# Patient Record
Sex: MALE | Race: WHITE | NOT HISPANIC OR LATINO | Employment: OTHER | URBAN - METROPOLITAN AREA
[De-identification: names, ages, dates, MRNs, and addresses within clinical notes are randomized per-mention and may not be internally consistent; named-entity substitution may affect disease eponyms.]

---

## 2021-05-27 ENCOUNTER — RECORDS - HEALTHEAST (OUTPATIENT)
Dept: ADMINISTRATIVE | Facility: CLINIC | Age: 61
End: 2021-05-27

## 2021-12-02 NOTE — TELEPHONE ENCOUNTER
RECORDS RECEIVED FROM: NEED MRI & IMAGING FROM [facility]. L5-S1 radiculopathy / Dr. Miguelina Menjivar @ [facility] /  / MRI, no surgery   DATE RECEIVED: Dec 16, 2021     NOTES STATUS DETAILS   OFFICE NOTE from referring provider RECEIVED    MEDICATION LIST Internal    MRI Received 11/24/21 12/03/21   10:10 AM   CALLED [facility] FOR IMAGES  Mery Hubbard CMA    12/03/21   8:17 AM   RECEIVED FAX FROM  ALL IMAGES AT [facility] NOT AT   Mery Hubbard CMA      12/02/21   2:07 PM   FAXED REQUEST TO  FOR IMAGES  Mery Hubbard CMA

## 2021-12-13 ASSESSMENT — ENCOUNTER SYMPTOMS
NUMBNESS: 1
MUSCLE WEAKNESS: 0
MYALGIAS: 1
ARTHRALGIAS: 0
DISTURBANCES IN COORDINATION: 0
SPEECH CHANGE: 0
NECK PAIN: 0
WEAKNESS: 0
BACK PAIN: 1
DIZZINESS: 1
HEADACHES: 0
JOINT SWELLING: 0
MEMORY LOSS: 0
LOSS OF CONSCIOUSNESS: 0
PARALYSIS: 0
STIFFNESS: 0
TINGLING: 1
SEIZURES: 0
MUSCLE CRAMPS: 1
TREMORS: 0

## 2021-12-16 ENCOUNTER — OFFICE VISIT (OUTPATIENT)
Dept: ORTHOPEDICS | Facility: CLINIC | Age: 61
End: 2021-12-16
Payer: COMMERCIAL

## 2021-12-16 ENCOUNTER — ANCILLARY PROCEDURE (OUTPATIENT)
Dept: GENERAL RADIOLOGY | Facility: CLINIC | Age: 61
End: 2021-12-16
Attending: ORTHOPAEDIC SURGERY
Payer: COMMERCIAL

## 2021-12-16 ENCOUNTER — PRE VISIT (OUTPATIENT)
Dept: ORTHOPEDICS | Facility: CLINIC | Age: 61
End: 2021-12-16

## 2021-12-16 VITALS — HEIGHT: 75 IN | BODY MASS INDEX: 31.08 KG/M2 | WEIGHT: 250 LBS

## 2021-12-16 DIAGNOSIS — M54.16 LUMBAR RADICULOPATHY: ICD-10-CM

## 2021-12-16 DIAGNOSIS — M54.16 LUMBAR RADICULOPATHY: Primary | ICD-10-CM

## 2021-12-16 PROCEDURE — 72100 X-RAY EXAM L-S SPINE 2/3 VWS: CPT | Performed by: RADIOLOGY

## 2021-12-16 PROCEDURE — 72170 X-RAY EXAM OF PELVIS: CPT | Performed by: RADIOLOGY

## 2021-12-16 PROCEDURE — 99204 OFFICE O/P NEW MOD 45 MIN: CPT | Performed by: ORTHOPAEDIC SURGERY

## 2021-12-16 RX ORDER — ASPIRIN 81 MG/1
81 TABLET, CHEWABLE ORAL DAILY
COMMUNITY

## 2021-12-16 ASSESSMENT — MIFFLIN-ST. JEOR: SCORE: 2019.62

## 2021-12-16 NOTE — LETTER
12/16/2021         RE: Fran Singh  [id number] [address]        Dear Colleague,    Thank you for referring your patient, Fran Singh, to the [facility] [place]. Please see a copy of my visit note below.    REASON FOR CONSULTATION: Consult (L5-S1 radiculopathy / Dr. 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 Occasionally tingling in left foot. Occasionally right leg pain in same distribution. Has had back pain intermittently through the years which was managed with PT and hasn't been an issue. Pain is worst at night. No significant improvement since pain started, but pain fluctuates up and down.    Conservative measures:  Injections: none  Medications: medrol dose pack, flexeril, tramadol- none of these helped. Can't tolerate gabapentin due dizziness/nausea.  Therapy: home exercise stretching program daily x 6 years for back, saw PT at [facility] but hasn't helped.     Oswestry (CANDIDO) Questionnaire    OSWESTRY DISABILITY INDEX 12/13/2021   Count 10   Sum 14   Oswestry Score (%) 28   Some recent data might be hidden     PROMIS-10 Scores  Global Mental Health Score: (P) 17  Global Physical Health Score: (P) 13   PROMIS TOTAL - SUBSCORES: (P) 30      Visual Analog Scale (VAS) Questionnaire    VISUAL ANALOG PAIN SCALE 12/16/2021   Back Pain Scale 0-10 8   Right leg pain 0   Left leg pain 8   Neck Pain Scale 0-10 0   Right arm pain 0   Left arm pain 0            REVIEW OF SYSTEMS:     Answers for HPI/ROS submitted by the patient on 12/13/2021  General Symptoms: No  Skin Symptoms: No  HENT Symptoms: No  EYE SYMPTOMS: No  HEART SYMPTOMS: No  LUNG SYMPTOMS: No  INTESTINAL SYMPTOMS: No  URINARY SYMPTOMS:  "No  REPRODUCTIVE SYMPTOMS: No  SKELETAL SYMPTOMS: Yes  BLOOD SYMPTOMS: No  NERVOUS SYSTEM SYMPTOMS: Yes  MENTAL HEALTH SYMPTOMS: No  Back pain: Yes  Muscle aches: Yes  Neck pain: No  Swollen joints: No  Joint pain: No  Bone pain: No  Muscle cramps: Yes  Muscle weakness: No  Joint stiffness: No  Bone fracture: No  Trouble with coordination: No  Dizziness or trouble with balance: Yes  Fainting or black-out spells: No  Memory loss: No  Headache: No  Seizures: No  Speech problems: No  Tingling: Yes  Tremor: No  Weakness: No  Difficulty walking: Yes  Paralysis: No  Numbness: Yes    No Known Allergies    PMH  Obesity BMI 31    No past surgical history on file.    No family history on file.    Social History     Tobacco Use     Smoking status: Not on file     Smokeless tobacco: Not on file   Substance Use Topics     Alcohol use: Not on file   was president of  union    Current Outpatient Medications   Medication     aspirin (ASA) 81 MG chewable tablet     UNABLE TO FIND     No current facility-administered medications for this visit.       PHYSICAL EXAM:    Ht 1.905 m (6' 3\")   Wt 113.4 kg (250 lb)   BMI 31.25 kg/m      Constitutional - Patient is healthy, well-nourished and appears stated age    Respiratory - Patient is breathing normally and in no respiratory distress.    Skin - No suspicious rashes or lesions.    Gait- raises from chair without assistance. Non-antalgic gait. Able to tandem gait.     Neurologic - Sensation intact to light touch bilaterally. Patella +2, ankle + 0.      Spine: Lumbar  - Appearance - Normal  - Palpation - Non-tender to palpation  - Facets non-tender to palpation, facet loading negative  - Straight leg raise positive left    Musculoskeletal:  5/5 iliopsoas, 5/5 quadriceps, 5/5 hamstrings, 5/5 anterior tibialis, 5/5 extensor hallucis longus, 5/5 gastrocnemius.      IMAGING: all imaging is personally reviewed and interpreted during the visit.   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      CLINICAL ASSESSMENT:   61 year old male with left lumbar radiculopathy secondary to L5-S1 disc herniation     DISCUSSION/PLAN:   We discussed the typical treatment of a lumbar disc herniation, and that most people will have resolution of pain without surgery.  We recommend a left L5-S1 transforaminal NEEMA.  This could be repeated 2 or 3 times if it gives him good relief.  He can call or send a [email or web address] message if he would like this repeated.  Our hope would be that his pain will improve over the next 6 weeks.  If not, he was instructed to contact us and we can follow-up to discuss a lumbar discectomy surgery which we briefly discussed.     All questions and concerns were answered to the patient's apparent satisfaction before leaving the clinic. We used the patient's imaging, diagrams, models to explain the pathophysiology of their disease as well as surgical and non-surgical treatment options.     Thank you for allowing us to be a part of this patient's care.   The above plan was formulated by Dr. Brown who also saw and examined the patient.     Respectfully,  Sherry Swain PA-C     I saw and evaluated the patient and developed the plan.  Jacobo Brown MD

## 2021-12-16 NOTE — PROGRESS NOTES
REASON FOR CONSULTATION: Consult (L5-S1 radiculopathy / Dr. 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 Occasionally tingling in left foot. Occasionally right leg pain in same distribution. Has had back pain intermittently through the years which was managed with PT and hasn't been an issue. Pain is worst at night. No significant improvement since pain started, but pain fluctuates up and down.    Conservative measures:  Injections: none  Medications: medrol dose pack, flexeril, tramadol- none of these helped. Can't tolerate gabapentin due dizziness/nausea.  Therapy: home exercise stretching program daily x 6 years for back, saw PT at [facility] but hasn't helped.     Oswestry (CANDIDO) Questionnaire    OSWESTRY DISABILITY INDEX 12/13/2021   Count 10   Sum 14   Oswestry Score (%) 28   Some recent data might be hidden     PROMIS-10 Scores  Global Mental Health Score: (P) 17  Global Physical Health Score: (P) 13   PROMIS TOTAL - SUBSCORES: (P) 30      Visual Analog Scale (VAS) Questionnaire    VISUAL ANALOG PAIN SCALE 12/16/2021   Back Pain Scale 0-10 8   Right leg pain 0   Left leg pain 8   Neck Pain Scale 0-10 0   Right arm pain 0   Left arm pain 0            REVIEW OF SYSTEMS:     Answers for HPI/ROS submitted by the patient on 12/13/2021  General Symptoms: No  Skin Symptoms: No  HENT Symptoms: No  EYE SYMPTOMS: No  HEART SYMPTOMS: No  LUNG SYMPTOMS: No  INTESTINAL SYMPTOMS: No  URINARY SYMPTOMS: No  REPRODUCTIVE SYMPTOMS: No  SKELETAL SYMPTOMS: Yes  BLOOD SYMPTOMS: No  NERVOUS SYSTEM SYMPTOMS: Yes  MENTAL HEALTH SYMPTOMS: No  Back pain: Yes  Muscle aches: Yes  Neck pain: No  Swollen joints: No  Joint pain: No  Bone pain: No  Muscle cramps: Yes  Muscle weakness:  "No  Joint stiffness: No  Bone fracture: No  Trouble with coordination: No  Dizziness or trouble with balance: Yes  Fainting or black-out spells: No  Memory loss: No  Headache: No  Seizures: No  Speech problems: No  Tingling: Yes  Tremor: No  Weakness: No  Difficulty walking: Yes  Paralysis: No  Numbness: Yes    No Known Allergies    PMH  Obesity BMI 31    No past surgical history on file.    No family history on file.    Social History     Tobacco Use     Smoking status: Not on file     Smokeless tobacco: Not on file   Substance Use Topics     Alcohol use: Not on file   was president of  union    Current Outpatient Medications   Medication     aspirin (ASA) 81 MG chewable tablet     UNABLE TO FIND     No current facility-administered medications for this visit.       PHYSICAL EXAM:    Ht 1.905 m (6' 3\")   Wt 113.4 kg (250 lb)   BMI 31.25 kg/m      Constitutional - Patient is healthy, well-nourished and appears stated age    Respiratory - Patient is breathing normally and in no respiratory distress.    Skin - No suspicious rashes or lesions.    Gait- raises from chair without assistance. Non-antalgic gait. Able to tandem gait.     Neurologic - Sensation intact to light touch bilaterally. Patella +2, ankle + 0.      Spine: Lumbar  - Appearance - Normal  - Palpation - Non-tender to palpation  - Facets non-tender to palpation, facet loading negative  - Straight leg raise positive left    Musculoskeletal:  5/5 iliopsoas, 5/5 quadriceps, 5/5 hamstrings, 5/5 anterior tibialis, 5/5 extensor hallucis longus, 5/5 gastrocnemius.      IMAGING: all imaging is personally reviewed and interpreted during the visit.   Scoliosis EOS Spine and Chairez pelvis XR, dated 12/16/2021   DISH changes with ankylosis T11-L2 with anterior osteophytes   Mild left lumbar scoliosis curve  Spondylosis L2-L3, L3-L4, L5-S1    MRI Lumbar Spine, dated 11/24/21  Spondylosis with disc bulge L2-L3, L3-L4  Left L5-S1 disc herniation, " compression of left S1 nerve root.      CLINICAL ASSESSMENT:   61 year old male with left lumbar radiculopathy secondary to L5-S1 disc herniation     DISCUSSION/PLAN:   We discussed the typical treatment of a lumbar disc herniation, and that most people will have resolution of pain without surgery.  We recommend a left L5-S1 transforaminal NEEMA.  This could be repeated 2 or 3 times if it gives him good relief.  He can call or send a [employer] message if he would like this repeated.  Our hope would be that his pain will improve over the next 6 weeks.  If not, he was instructed to contact us and we can follow-up to discuss a lumbar discectomy surgery which we briefly discussed.     All questions and concerns were answered to the patient's apparent satisfaction before leaving the clinic. We used the patient's imaging, diagrams, models to explain the pathophysiology of their disease as well as surgical and non-surgical treatment options.     Thank you for allowing us to be a part of this patient's care.   The above plan was formulated by Dr. Brown who also saw and examined the patient.     Respectfully,  Sherry Swain PA-C     I saw and evaluated the patient and developed the plan.  Jacobo Brown MD

## 2021-12-16 NOTE — NURSING NOTE
"Reason For Visit:   Chief Complaint   Patient presents with     Consult     L5-S1 radiculopathy / Dr. Miguelina Menjivar @ [facility]        Primary MD: Ashwin Tillman MD  Ref. MD: Dr. Miguelina Menjivar     ?  No  Occupation Retired.    Date of injury: No  Type of injury: unknown.    Date of surgery: No  Type of surgery: No.    Smoker: No  Request smoking cessation information: No    Ht 1.905 m (6' 3\")   Wt 113.4 kg (250 lb)   BMI 31.25 kg/m      Pain Assessment  Patient Currently in Pain: Yes  0-10 Pain Scale: 8  Primary Pain Location: Back    Oswestry (CANDIDO) Questionnaire    OSWESTRY DISABILITY INDEX 12/13/2021   Count 10   Sum 14   Oswestry Score (%) 28   Some recent data might be hidden        Visual Analog Pain Scale  Back Pain Scale 0-10: 8  Right leg pain: 0  Left leg pain: 8  Neck Pain Scale 0-10: 0  Right arm pain: 0  Left arm pain: 0    Promis 10 Assessment    PROMIS 10 12/13/2021   In general, would you say your health is: Very good   In general, would you say your quality of life is: Very good   In general, how would you rate your physical health? Very good   In general, how would you rate your mental health, including your mood and your ability to think? Very good   In general, how would you rate your satisfaction with your social activities and relationships? Excellent   In general, please rate how well you carry out your usual social activities and roles Fair   To what extent are you able to carry out your everyday physical activities such as walking, climbing stairs, carrying groceries, or moving a chair? Mostly   How often have you been bothered by emotional problems such as feeling anxious, depressed or irritable? Rarely   How would you rate your fatigue on average? Moderate   How would you rate your pain on average?   0 = No Pain  to  10 = Worst Imaginable Pain 7   In general, would you say your health is: 4   In general, would you say your quality of life is: 4   In general, how " would you rate your physical health? 4   In general, how would you rate your mental health, including your mood and your ability to think? 4   In general, how would you rate your satisfaction with your social activities and relationships? 5   In general, please rate how well you carry out your usual social activities and roles. (This includes activities at home, at work and in your community, and responsibilities as a parent, child, spouse, employee, friend, etc.) 2   To what extent are you able to carry out your everyday physical activities such as walking, climbing stairs, carrying groceries, or moving a chair? 4   In the past 7 days, how often have you been bothered by emotional problems such as feeling anxious, depressed, or irritable? 2   In the past 7 days, how would you rate your fatigue on average? 3   In the past 7 days, how would you rate your pain on average, where 0 means no pain, and 10 means worst imaginable pain? 7   Global Mental Health Score 17   Global Physical Health Score 13   PROMIS TOTAL - SUBSCORES 30   Some recent data might be hidden                Jessica Hui LPN

## 2022-01-03 ENCOUNTER — TELEPHONE (OUTPATIENT)
Dept: ORTHOPEDICS | Facility: CLINIC | Age: 62
End: 2022-01-03
Payer: COMMERCIAL

## 2022-01-03 DIAGNOSIS — M54.16 LUMBAR RADICULOPATHY: Primary | ICD-10-CM

## 2022-01-03 DIAGNOSIS — M51.26 HERNIATED LUMBAR INTERVERTEBRAL DISC: ICD-10-CM

## 2022-01-03 NOTE — TELEPHONE ENCOUNTER
See phone message from pt & see dictation of new appt 12-16-21 for plan.    I called pt back who stated got 60% relief of pain after Right L5-S1 TFESI which really helped.  States it has been 2.5 weeks since injection & his pain level is not relieved as much as he wants.     Wants repeat injection. Dictation states if gets significant relief then can have same injection repeated 2-3 times over the next 6 weeks while waiting for L5-S1 Disc herniation to improve.    Faxed new injection order to [facility] & pt will call to schedule.    Pt understands to call us back if does not continue to improve.    Call back prn. Pt agreed.  WMERLY./Ro Swain RN.

## 2022-01-03 NOTE — TELEPHONE ENCOUNTER
[facility] Call Center    Phone Message    May a detailed message be left on voicemail: yes     Reason for Call: Order(s): Other:   Reason for requested: XR Lumbar Sacral Transforaminal Inj Right  Date needed: today   Provider name: Stephanie      Action Taken: Message routed to:  [facility]o    Travel Screening: Not Applicable     Patient is calling to get another Injection ordered for his back - was instructed to wait 2 wks before we could order another one , would like it faxed to [facility]  --       He says the last injection gave him significant relief but it is not where he wants it to be     Please call back to confirm this is dine / discuss

## 2022-01-30 ENCOUNTER — HEALTH MAINTENANCE LETTER (OUTPATIENT)
Age: 62
End: 2022-01-30

## 2022-09-25 ENCOUNTER — HEALTH MAINTENANCE LETTER (OUTPATIENT)
Age: 62
End: 2022-09-25

## 2022-11-21 ENCOUNTER — TELEPHONE (OUTPATIENT)
Dept: ORTHOPEDICS | Facility: CLINIC | Age: 62
End: 2022-11-21

## 2022-11-21 DIAGNOSIS — M54.16 LUMBAR RADICULOPATHY: Primary | ICD-10-CM

## 2022-11-21 DIAGNOSIS — M51.26 HERNIATED LUMBAR INTERVERTEBRAL DISC: ICD-10-CM

## 2022-11-21 NOTE — TELEPHONE ENCOUNTER
[facility] Call Center    Phone Message    May a detailed message be left on voicemail: yes     Reason for Call: Other: Pt would like to discuss surg options . He has had 2 inj in Dec and Jan and now is ready for perm relief. Next avail appt for Dr Brown is 01/25/23 pt did not want to wait that long just for a discussion. Pt asking for other options. Most recent MRI in 12/2021 at [facility] in [place]     Action Taken: Other: ortho [facility]    Travel Screening: Not Applicable

## 2022-11-21 NOTE — TELEPHONE ENCOUNTER
See phone message from pt. See dictation Dec 2021.   I discussed case with Emilie ROMO.  I called pt back.  States got significant relief of pain & was able to increase activity after both injections L5-S1 done Dec 2021 & Jan 2022 at [facility].  I asked [facility] to transfer both injection to us.  Now C/O return of pain & wants to rediscuss surgery & doesn't want to wait until end of Jan to see  at first available.    Emilie ordered updated MRI Lumbar & stated pt can see one of 's partners for same discussion if he doesn't want to wait.    Pt agreed & requested MRI be done at [facility].  Faxed order & he will call to schedule.    Pt knows to call Ortho clinic back [phone] & make appt. After MRI date.  Call back prn.  Pt agreed.  [email or web address].Emilie ROMO/Ro Swain RN.

## 2022-11-22 ENCOUNTER — TELEPHONE (OUTPATIENT)
Dept: ORTHOPEDICS | Facility: CLINIC | Age: 62
End: 2022-11-22

## 2022-11-22 ENCOUNTER — TRANSFERRED RECORDS (OUTPATIENT)
Dept: HEALTH INFORMATION MANAGEMENT | Facility: CLINIC | Age: 62
End: 2022-11-22

## 2022-11-22 NOTE — TELEPHONE ENCOUNTER
Writer called and talked with patient on the phone. Writer stated that the MRI order was faxed to [facility]. [phone].     Jessica Hui LPN

## 2022-11-22 NOTE — TELEPHONE ENCOUNTER
[facility] Call Center    Phone Message    May a detailed message be left on voicemail: yes     Reason for Call: Other: Patient is needing orders faxed to [facility] so he can schedule his MRI please call when it has been faxed.     Action Taken: Other: [facility] ortho    Travel Screening: Not Applicable

## 2022-11-27 ASSESSMENT — ENCOUNTER SYMPTOMS
STIFFNESS: 0
BACK PAIN: 1
ARTHRALGIAS: 0
JOINT SWELLING: 0
MUSCLE WEAKNESS: 0
MYALGIAS: 0
MUSCLE CRAMPS: 0
NECK PAIN: 0

## 2022-11-30 ENCOUNTER — ANCILLARY PROCEDURE (OUTPATIENT)
Dept: GENERAL RADIOLOGY | Facility: CLINIC | Age: 62
End: 2022-11-30
Attending: ORTHOPAEDIC SURGERY
Payer: COMMERCIAL

## 2022-11-30 ENCOUNTER — OFFICE VISIT (OUTPATIENT)
Dept: ORTHOPEDICS | Facility: CLINIC | Age: 62
End: 2022-11-30
Payer: COMMERCIAL

## 2022-11-30 VITALS — HEIGHT: 75 IN | WEIGHT: 259 LBS | BODY MASS INDEX: 32.2 KG/M2

## 2022-11-30 DIAGNOSIS — M54.16 LUMBAR RADICULOPATHY: Primary | ICD-10-CM

## 2022-11-30 DIAGNOSIS — M47.817 LUMBOSACRAL SPONDYLOSIS WITHOUT MYELOPATHY: Primary | ICD-10-CM

## 2022-11-30 PROCEDURE — 72110 X-RAY EXAM L-2 SPINE 4/>VWS: CPT | Performed by: STUDENT IN AN ORGANIZED HEALTH CARE EDUCATION/TRAINING PROGRAM

## 2022-11-30 PROCEDURE — 99213 OFFICE O/P EST LOW 20 MIN: CPT | Performed by: ORTHOPAEDIC SURGERY

## 2022-11-30 RX ORDER — MELOXICAM 7.5 MG/1
7.5 TABLET ORAL DAILY
Qty: 14 TABLET | Refills: 0 | Status: SHIPPED | OUTPATIENT
Start: 2022-11-30 | End: 2022-12-14

## 2022-11-30 NOTE — LETTER
11/30/2022         RE: Fran Singh  [id number] [facility] [id number]        Dear Colleague,    Thank you for referring your patient, Fran Singh, to the [facility] [place]. Please see a copy of my visit note below.    REASON FOR VISIT: RECHECK    REFERRING PHYSICIAN: Miguelina Menjivar     PRIMARY CARE PHYSICIAN: Ashwin Tillman    HISTORY OF PRESENT ILLNESS: Fran Singh is a 62 year old male who presents for follow-up on low back pain.    He notes that he has had resolution of his left S1 radicular symptoms after his injection in January.  He notes that the injection helped improve his symptoms for about 6 months.  He notes that his previous injection prior to the 1 in January had the same lasting relief.  He notes that 2 weeks ago he had a flareup of symptoms when sitting in his deer stand.  Since then he has had severe back pain left greater than right with numbness that presents down the posterior aspect of bilateral legs only at bedtime.  He notes that symptoms worsen from transitioning from sitting to standing.  Notes that symptoms get better with rest.  Notes that he is not limited with standing and walking.  Notes that prior to his injury he was able to walk about 4 miles, probably can still get about a mile but has not tried due to the level of pain that he is experiencing right now.  Notes that his pain is a 7 out of 10 on exam today.  He denies any changes to bowel or bladder function.  He denies any numbness to his groin or anus.  Notes that he did physical therapy at at [facility] prior to his last visit with us.  Denies any recent PT. notes that he has not been utilizing any pain meds for the past 2 weeks.       Oswestry score:   Oswestry (CANDIDO) Questionnaire    OSWESTRY DISABILITY INDEX 11/27/2022   Count 10   Sum 14   Oswestry Score (%) 28   Some recent data might be hidden     Visual Analog Scale (VAS) Questionnaire    VISUAL ANALOG PAIN SCALE 11/30/2022  "  Back Pain Scale 0-10 7   Right leg pain 3   Left leg pain 3   Neck Pain Scale 0-10 0   Right arm pain 0   Left arm pain 0          PHYSICAL EXAM:    Vitals: Ht 1.905 m (6' 3\")   Wt 117.5 kg (259 lb)   BMI 32.37 kg/m      Constitutional: Patient is healthy, well-nourished and appears stated age.    Respiratory: Patient is breathing normally and in no respiratory distress.    Skin: No suspicious rashes or lesions.     Gait: Non-antalgic gait without use of assistive devices. Able to tandem gait without difficulty.     Neurologic - Sensation intact to light touch bilaterally. Deep tendon reflexes 2+ bilateral patellar, 1+ bilateral Achilles.     Musculoskeletal: Strength: 5 out of 5 bilateral hip flexion, knee extension, dorsiflexion, plantarflexion, extensor hallux longus    Spine: overall good sagittal balance  o Cervical spine:  - Normal lordosis  - Non-tender to palpation  - Full ROM without pain  o Thoracic Spine:  - Appearance - Normal kyphosis  - Palpation - Non-tender to palpation   o Lumbosacral Spine:  - Normal lordosis  - Tender to palpation over left lower lumbar facets, positive facet loading sign on the left side.  - ROM -pain worsens with rotation, unchanged with flexion and extension     Hips: No pain with hip log roll and no tenderness over the greater trochanters. Piper negative.    SI joint non-tender to palpation. Piriformis stretch test negative.    IMAGING:   The following imaging was independently reviewed and interpreted in clinic. See full radiologic report in chart.    XR lumbar 4 views.    L3-4 right asymmetric neural foramen collapse, multilevel degenerative disc disease most severe at L3-4        No evidence of dynamic instability or subluxation with flexion-extension views. Patient with presentation of diffuse idiopathic skeletal hyperostosis with anterior endplate fusion from T12-L1, L2-3 resulting in limited mobility of upper lumbar spine        MRI 11/22/2022    Lumbar spondylosis, " facet arthropathy most present at L2-3, L3-4 and L4 set fluid sign noted at L2-3 and L3-4.    L3-4 disc herniation resulting in moderate central canal stenosis and mild right neural foraminal stenosis        L5-S1 disc herniation resulting in left lateral recess stenosis on the left S1 nerve root improved compared to previous MRI on 11/24/2021        CLINICAL ASSESSMENT:   Fran Singh is a 62 year old male with lumbar spondylosis and facet arthropathy, diffuse idiopathic hyperostosis, acute on chronic low back pain without sciatica, obesity BMI of 32    DISCUSSION/PLAN:   Param is found to have lumbar spondylosis, presentation of DISH with significant osteophyte complex on anterior endplates.  He was educated that this presentation causes increased strain on the mobile lumbar segments leading to more shear and load on the facets.  He is found to have facet loading sign positive on exam on the left side specifically at L4-5.  He was educated that we will plan on starting by treating his symptoms conservatively.  He was educated on ice rest and utilizing a daily anti-inflammatory like Mobic. We will also plan on starting physical therapy for core strengthening and mobilization.  He was educated that if symptoms or not resolving in the next 4 to 6 weeks, we we will proceed with a left L4-5 medial branch block.  He was educated that if this medial branch block results in symptomatic improvement he can proceed with an RFA at a later date.     He is further educated that his recent updated MRI shows mild improvement of his left L5-S1 disc herniation that results in lateral recess stenosis of the traversing left S1 nerve.  He was educated that he has asymmetric disc collapse and neural foramen collapse resulting and mild right lumbar scoliosis.  There is evidence of narrowing on the right side at L3-4 in the neuroforamen that potentially could be contributing to his symptoms.  It is believed may be most of his concerns  is resulting from his facet arthropathy.  He was educated that we will plan on exhausting conservative options prior to surgery.  He was educated that he had no once symptoms are debilitating to proceed with surgery: Symptoms keeping him up at night, limitation with standing and walking.  His CANDIDO is 28 today.    -Mobic  -Rest and ICE  -Physical therapy  -May proceed with left L4-5 medial branch block in 4 to 6-week    All questions and concerns were answered to the patient's apparent satisfaction before leaving the clinic. We used models, the patients imaging, and drawn diagrams to explain the pathophysiology, and treatments options including surgical and non-surgical.     Thank you for allowing Dr. Brown and I to participate in the care of Fran Singh. The above plan was formulated by Dr. Brown who also saw and examined the patient.     Respectfully,  Bishop SHAHAB Martinez PA-C     I saw and evaluated the patient and developed the plan. I interpreted the imaging studies, reviewed them with the patient and explained the disease process. With his significant osteophytes he has limited to no mobility in those segments. That places more stress on those segments without the osteophytes. This probably aggravates the nerves at those other levels.    Jacobo Brown MD

## 2022-11-30 NOTE — NURSING NOTE
"Reason For Visit:   Chief Complaint   Patient presents with     RECHECK     MRI results        Primary MD: Ashwin Tillman  Ref. MD: est.    ?  No  Currently working? No.  Work status?  Retired.  No previous back surgeries, saw Dr. Brown about a year ago, and has gotten 2 injections since that last time    Smoker: No  Request smoking cessation information: No    Ht 1.905 m (6' 3\")   Wt 117.5 kg (259 lb)   BMI 32.37 kg/m      Pain Assessment  Patient Currently in Pain: Yes  Patient's Stated Pain Goal: 7 (ranges)    Oswestry (CANDIDO) Questionnaire    OSWESTRY DISABILITY INDEX 11/27/2022   Count 10   Sum 14   Oswestry Score (%) 28   Some recent data might be hidden            Neck Disability Index (NDI) Questionnaire    No flowsheet data found.           Visual Analog Pain Scale  Back Pain Scale 0-10: 7  Right leg pain: 3 (not pain numbess)  Left leg pain: 3 (not pain numbess)  Neck Pain Scale 0-10: 0  Right arm pain: 0  Left arm pain: 0    Promis 10 Assessment    PROMIS 10 11/27/2022   In general, would you say your health is: Very good   In general, would you say your quality of life is: Very good   In general, how would you rate your physical health? Very good   In general, how would you rate your mental health, including your mood and your ability to think? Very good   In general, how would you rate your satisfaction with your social activities and relationships? Very good   In general, please rate how well you carry out your usual social activities and roles Very good   To what extent are you able to carry out your everyday physical activities such as walking, climbing stairs, carrying groceries, or moving a chair? Mostly   How often have you been bothered by emotional problems such as feeling anxious, depressed or irritable? Rarely   How would you rate your fatigue on average? Mild   How would you rate your pain on average?   0 = No Pain  to  10 = Worst Imaginable Pain 7   In general, would you say your " health is: 4   In general, would you say your quality of life is: 4   In general, how would you rate your physical health? 4   In general, how would you rate your mental health, including your mood and your ability to think? 4   In general, how would you rate your satisfaction with your social activities and relationships? 4   In general, please rate how well you carry out your usual social activities and roles. (This includes activities at home, at work and in your community, and responsibilities as a parent, child, spouse, employee, friend, etc.) 4   To what extent are you able to carry out your everyday physical activities such as walking, climbing stairs, carrying groceries, or moving a chair? 4   In the past 7 days, how often have you been bothered by emotional problems such as feeling anxious, depressed, or irritable? 2   In the past 7 days, how would you rate your fatigue on average? 2   In the past 7 days, how would you rate your pain on average, where 0 means no pain, and 10 means worst imaginable pain? 7   Global Mental Health Score 16   Global Physical Health Score 14   PROMIS TOTAL - SUBSCORES 30   Some recent data might be hidden                [place]te [place]

## 2022-11-30 NOTE — PROGRESS NOTES
"REASON FOR VISIT: RECHECK    REFERRING PHYSICIAN: Miguelina Menjivar     PRIMARY CARE PHYSICIAN: Ashwin Tillman    HISTORY OF PRESENT ILLNESS: Fran Singh is a 62 year old male who presents for follow-up on low back pain.    He notes that he has had resolution of his left S1 radicular symptoms after his injection in January.  He notes that the injection helped improve his symptoms for about 6 months.  He notes that his previous injection prior to the 1 in January had the same lasting relief.  He notes that 2 weeks ago he had a flareup of symptoms when sitting in his deer stand.  Since then he has had severe back pain left greater than right with numbness that presents down the posterior aspect of bilateral legs only at bedtime.  He notes that symptoms worsen from transitioning from sitting to standing.  Notes that symptoms get better with rest.  Notes that he is not limited with standing and walking.  Notes that prior to his injury he was able to walk about 4 miles, probably can still get about a mile but has not tried due to the level of pain that he is experiencing right now.  Notes that his pain is a 7 out of 10 on exam today.  He denies any changes to bowel or bladder function.  He denies any numbness to his groin or anus.  Notes that he did physical therapy at at [facility] prior to his last visit with us.  Denies any recent PT. notes that he has not been utilizing any pain meds for the past 2 weeks.       Oswestry score:   Oswestry (CANDIDO) Questionnaire    OSWESTRY DISABILITY INDEX 11/27/2022   Count 10   Sum 14   Oswestry Score (%) 28   Some recent data might be hidden     Visual Analog Scale (VAS) Questionnaire    VISUAL ANALOG PAIN SCALE 11/30/2022   Back Pain Scale 0-10 7   Right leg pain 3   Left leg pain 3   Neck Pain Scale 0-10 0   Right arm pain 0   Left arm pain 0          PHYSICAL EXAM:    Vitals: Ht 1.905 m (6' 3\")   Wt 117.5 kg (259 lb)   BMI 32.37 kg/m      Constitutional: Patient is healthy, " well-nourished and appears stated age.    Respiratory: Patient is breathing normally and in no respiratory distress.    Skin: No suspicious rashes or lesions.     Gait: Non-antalgic gait without use of assistive devices. Able to tandem gait without difficulty.     Neurologic - Sensation intact to light touch bilaterally. Deep tendon reflexes 2+ bilateral patellar, 1+ bilateral Achilles.     Musculoskeletal: Strength: 5 out of 5 bilateral hip flexion, knee extension, dorsiflexion, plantarflexion, extensor hallux longus    Spine: overall good sagittal balance  o Cervical spine:  - Normal lordosis  - Non-tender to palpation  - Full ROM without pain  o Thoracic Spine:  - Appearance - Normal kyphosis  - Palpation - Non-tender to palpation   o Lumbosacral Spine:  - Normal lordosis  - Tender to palpation over left lower lumbar facets, positive facet loading sign on the left side.  - ROM -pain worsens with rotation, unchanged with flexion and extension     Hips: No pain with hip log roll and no tenderness over the greater trochanters. Piper negative.    SI joint non-tender to palpation. Piriformis stretch test negative.    IMAGING:   The following imaging was independently reviewed and interpreted in clinic. See full radiologic report in chart.    XR lumbar 4 views.    L3-4 right asymmetric neural foramen collapse, multilevel degenerative disc disease most severe at L3-4        No evidence of dynamic instability or subluxation with flexion-extension views. Patient with presentation of diffuse idiopathic skeletal hyperostosis with anterior endplate fusion from T12-L1, L2-3 resulting in limited mobility of upper lumbar spine        MRI 11/22/2022    Lumbar spondylosis, facet arthropathy most present at L2-3, L3-4 and L4 set fluid sign noted at L2-3 and L3-4.    L3-4 disc herniation resulting in moderate central canal stenosis and mild right neural foraminal stenosis        L5-S1 disc herniation resulting in left lateral  recess stenosis on the left S1 nerve root improved compared to previous MRI on 11/24/2021        CLINICAL ASSESSMENT:   Fran Singh is a 62 year old male with lumbar spondylosis and facet arthropathy, diffuse idiopathic hyperostosis, acute on chronic low back pain without sciatica, obesity BMI of 32    DISCUSSION/PLAN:   Param is found to have lumbar spondylosis, presentation of DISH with significant osteophyte complex on anterior endplates.  He was educated that this presentation causes increased strain on the mobile lumbar segments leading to more shear and load on the facets.  He is found to have facet loading sign positive on exam on the left side specifically at L4-5.  He was educated that we will plan on starting by treating his symptoms conservatively.  He was educated on ice rest and utilizing a daily anti-inflammatory like Mobic. We will also plan on starting physical therapy for core strengthening and mobilization.  He was educated that if symptoms or not resolving in the next 4 to 6 weeks, we we will proceed with a left L4-5 medial branch block.  He was educated that if this medial branch block results in symptomatic improvement he can proceed with an RFA at a later date.     He is further educated that his recent updated MRI shows mild improvement of his left L5-S1 disc herniation that results in lateral recess stenosis of the traversing left S1 nerve.  He was educated that he has asymmetric disc collapse and neural foramen collapse resulting and mild right lumbar scoliosis.  There is evidence of narrowing on the right side at L3-4 in the neuroforamen that potentially could be contributing to his symptoms.  It is believed may be most of his concerns is resulting from his facet arthropathy.  He was educated that we will plan on exhausting conservative options prior to surgery.  He was educated that he had no once symptoms are debilitating to proceed with surgery: Symptoms keeping him up at night,  limitation with standing and walking.  His CANDIDO is 28 today.    -Mobic  -Rest and ICE  -Physical therapy  -May proceed with left L4-5 medial branch block in 4 to 6-week    All questions and concerns were answered to the patient's apparent satisfaction before leaving the clinic. We used models, the patients imaging, and drawn diagrams to explain the pathophysiology, and treatments options including surgical and non-surgical.     Thank you for allowing Dr. Brown and I to participate in the care of Fran Singh. The above plan was formulated by Dr. Brown who also saw and examined the patient.     Respectfully,  Bishop SHAHAB Martinez PA-C     I saw and evaluated the patient and developed the plan. I interpreted the imaging studies, reviewed them with the patient and explained the disease process. With his significant osteophytes he has limited to no mobility in those segments. That places more stress on those segments without the osteophytes. This probably aggravates the nerves at those other levels.    Jacobo Brown MD

## 2022-12-02 ENCOUNTER — TELEPHONE (OUTPATIENT)
Dept: ORTHOPEDICS | Facility: CLINIC | Age: 62
End: 2022-12-02

## 2022-12-02 DIAGNOSIS — M54.16 LUMBAR RADICULOPATHY: Primary | ICD-10-CM

## 2022-12-02 RX ORDER — CYCLOBENZAPRINE HCL 10 MG
10 TABLET ORAL 3 TIMES DAILY PRN
Qty: 42 TABLET | Refills: 0 | Status: SHIPPED | OUTPATIENT
Start: 2022-12-02 | End: 2022-12-16

## 2022-12-02 NOTE — TELEPHONE ENCOUNTER
Spoke to patria. Notes worsening pain last night. Denies any new symptoms. Denies any trauma. Plan will be to move forward with proceeding with the medial branch blocks and potential for RFA if he has symptom improvement. We will also send a script of flexeril to help with sleeping at night, and a medrol dos grace. He has initiated the mobic 7.5 daily NSAID. Recommending Ice/heat.     Educated time is the key factor and goal is to get symptoms tolerable. He will be starting PT in next few weeks.    Bishop SHAHAB Martinez PA-C

## 2022-12-02 NOTE — TELEPHONE ENCOUNTER
Writer called and talked with patient on the phone. Pt states that the pain is getting bother some and making it difficult to sleep through the night. Pt was recently prescribed meloxicam and does not feel any relief from it. The previous night pt would in middle of night and due to pain it took 1.5 hours to be able to get back into bed to try to sleep some more.     Writer will forward the message onto the providers team.     Jessica Hui LPN

## 2022-12-02 NOTE — TELEPHONE ENCOUNTER
[facility] Call Center    Phone Message    May a detailed message be left on voicemail: yes     Reason for Call Patient asking for call back . He didn't get any sleep last nite . His Back Pain wouldn't allow him to sleep. Please call Action Taken: Message routed to:  [facility]): angela    Travel Screening: Not Applicable

## 2022-12-07 ENCOUNTER — THERAPY VISIT (OUTPATIENT)
Dept: PHYSICAL THERAPY | Facility: CLINIC | Age: 62
End: 2022-12-07
Attending: PHYSICIAN ASSISTANT
Payer: COMMERCIAL

## 2022-12-07 DIAGNOSIS — M54.50 ACUTE BILATERAL LOW BACK PAIN WITHOUT SCIATICA: ICD-10-CM

## 2022-12-07 DIAGNOSIS — M47.817 LUMBOSACRAL SPONDYLOSIS WITHOUT MYELOPATHY: ICD-10-CM

## 2022-12-07 PROCEDURE — 97110 THERAPEUTIC EXERCISES: CPT | Mod: GP | Performed by: PHYSICAL THERAPIST

## 2022-12-07 PROCEDURE — 97161 PT EVAL LOW COMPLEX 20 MIN: CPT | Mod: GP | Performed by: PHYSICAL THERAPIST

## 2022-12-07 NOTE — PROGRESS NOTES
Physical Therapy Initial Evaluation  Subjective:  Pt describes the onset of severe LBP in mid November.  He believes it was triggered by sitting awkwardly in a deer stand while hunting.  He has had previous episodes of LBP, but none as severe as this one.  His pain has improved within the last week.  Worse with sit to stand transfers, walking, standing, and prolonged sitting.      The history is provided by the patient.   Therapist Generated HPI Evaluation         Type of problem:  Lumbar.    This is a new condition.  Condition occurred with:  Insidious onset.    Patient reports pain:  Lower lumbar spine, lumbar spine right and lumbar spine left.  Pain is described as aching, stabbing and sharp and is intermittent.  Pain radiates to:  No radiation. Pain is the same all the time.  Since onset symptoms are gradually improving.  Associated symptoms:  Loss of motion/stiffness. Symptoms are exacerbated by lifting, sitting, standing and walking  and relieved by nothing.  Special tests included:  MRI and x-ray.    Restrictions due to condition include:  Working in normal job without restrictions.  Barriers include:  None as reported by patient.    Patient Health History             Pertinent medical history includes: overweight.            Current medications:  Anti-inflammatory and muscle relaxants.    Current occupation is part time sales/retired.   Primary job tasks include:  Computer work.                                    Objective:  System         Lumbar/SI Evaluation  ROM:    AROM Lumbar:   Flexion:          Full.  Ext:                    Moderate restriction with LBP   Side Bend:        Left:  LBP    Right:  LBP  Rotation:           Left:     Right:   Side Glide:        Left:     Right:           Lumbar Myotomes:  normal            Lumbar DTR's:  not assessed        Lumbar Dermtomes:  not assessed                Neural Tension/Mobility:  Lumbar:  Normal                                                              General     ROS    Assessment/Plan:    Patient is a 62 year old male with lumbar complaints.    Patient has the following significant findings with corresponding treatment plan.                Diagnosis 1:  LBP  Pain -  hot/cold therapy, education and home program  Decreased ROM/flexibility - manual therapy, therapeutic exercise and home program  Decreased strength - therapeutic exercise, therapeutic activities and home program    Therapy Evaluation Codes:   1) History comprised of:   Personal factors that impact the plan of care:      None.    Comorbidity factors that impact the plan of care are:      None.     Medications impacting care: None.  2) Examination of Body Systems comprised of:   Body structures and functions that impact the plan of care:      Lumbar spine.   Activity limitations that impact the plan of care are:      Lifting, Sitting, Standing and Walking.  3) Clinical presentation characteristics are:   Stable/Uncomplicated.  4) Decision-Making    Low complexity using standardized patient assessment instrument and/or measureable assessment of functional outcome.  Cumulative Therapy Evaluation is: Low complexity.    Previous and current functional limitations:  (See Goal Flow Sheet for this information)    Short term and Long term goals: (See Goal Flow Sheet for this information)     Communication ability:  Patient appears to be able to clearly communicate and understand verbal and written communication and follow directions correctly.  Treatment Explanation - The following has been discussed with the patient:   RX ordered/plan of care  Anticipated outcomes  Possible risks and side effects  This patient would benefit from PT intervention to resume normal activities.   Rehab potential is good.    Frequency:  2 X a month, once daily  Duration:  for 2 months  Discharge Plan:  Achieve all LTG.  Independent in home treatment program.  Reach maximal therapeutic benefit.    Please refer to the daily  flowsheet for treatment today, total treatment time and time spent performing 1:1 timed codes.

## 2022-12-21 ENCOUNTER — THERAPY VISIT (OUTPATIENT)
Dept: PHYSICAL THERAPY | Facility: CLINIC | Age: 62
End: 2022-12-21
Payer: COMMERCIAL

## 2022-12-21 DIAGNOSIS — M54.50 ACUTE BILATERAL LOW BACK PAIN WITHOUT SCIATICA: Primary | ICD-10-CM

## 2022-12-21 PROCEDURE — 97110 THERAPEUTIC EXERCISES: CPT | Mod: GP | Performed by: PHYSICAL THERAPIST

## 2022-12-21 PROCEDURE — 97112 NEUROMUSCULAR REEDUCATION: CPT | Mod: GP | Performed by: PHYSICAL THERAPIST

## 2023-01-19 ENCOUNTER — OFFICE VISIT (OUTPATIENT)
Dept: ORTHOPEDICS | Facility: CLINIC | Age: 63
End: 2023-01-19
Payer: COMMERCIAL

## 2023-01-19 ENCOUNTER — ANCILLARY PROCEDURE (OUTPATIENT)
Dept: GENERAL RADIOLOGY | Facility: CLINIC | Age: 63
End: 2023-01-19
Attending: ORTHOPAEDIC SURGERY
Payer: COMMERCIAL

## 2023-01-19 VITALS — HEIGHT: 74 IN | BODY MASS INDEX: 32.6 KG/M2 | WEIGHT: 254 LBS

## 2023-01-19 DIAGNOSIS — M47.817 LUMBOSACRAL SPONDYLOSIS WITHOUT MYELOPATHY: ICD-10-CM

## 2023-01-19 DIAGNOSIS — M47.817 LUMBOSACRAL SPONDYLOSIS WITHOUT MYELOPATHY: Primary | ICD-10-CM

## 2023-01-19 DIAGNOSIS — M48.10 DIFFUSE IDIOPATHIC SKELETAL HYPEROSTOSIS: ICD-10-CM

## 2023-01-19 DIAGNOSIS — M41.56 SCOLIOSIS OF LUMBAR REGION DUE TO DEGENERATIVE DISEASE OF SPINE IN ADULT: ICD-10-CM

## 2023-01-19 PROCEDURE — 77073 BONE LENGTH STUDIES: CPT | Performed by: STUDENT IN AN ORGANIZED HEALTH CARE EDUCATION/TRAINING PROGRAM

## 2023-01-19 PROCEDURE — 72082 X-RAY EXAM ENTIRE SPI 2/3 VW: CPT | Performed by: STUDENT IN AN ORGANIZED HEALTH CARE EDUCATION/TRAINING PROGRAM

## 2023-01-19 PROCEDURE — 99213 OFFICE O/P EST LOW 20 MIN: CPT | Performed by: ORTHOPAEDIC SURGERY

## 2023-01-19 NOTE — PROGRESS NOTES
FOLLOWUP EVALUATION     SUBJECTIVE:  Fran presented with severe back pain and was initially treated with meloxicam and PT. The meloxicam was inadequate and then he called back and got some cyclobenzaprine which gave him profound, nearly immediate improvement.  He took it regularly for a couple days, then only in the evening and now only very rarely, like maybe once a month or so, is using it.  Visual analog pain scale is a 2.  Oswestry score is an 8.  Feels like he is doing much better.  He is doing his PT at home and came back today just to follow up.    OBJECTIVE:    GENERAL:  Physical exam shows a well-developed, well-nourished male in no acute distress.    MUSCULOSKELETAL:  His gait and station seem normal.    IMAGING:  Radiographic exam performed today includes AP and lateral EOS.  This shows his diffuse idiopathic skeletal hyperostosis.  I reviewed the imaging and went over the imaging with him, explained the concept of diffuse idiopathic skeletal hyperostosis and his mild degenerative scoliosis.    ASSESSMENT:  Back pain improved in a patient with DISH and degenerative scoliosis.    PLAN:  We had an extended discussion about nonoperative treatment and we talked about pain medication use, specifically about the challenge of cyclobenzaprine, that if he is using this occasionally, then that is very appropriate.      Answers for HPI/ROS submitted by the patient on 1/18/2023  General Symptoms: No  Skin Symptoms: No  HENT Symptoms: No  EYE SYMPTOMS: No  HEART SYMPTOMS: No  LUNG SYMPTOMS: No  INTESTINAL SYMPTOMS: No  URINARY SYMPTOMS: No  REPRODUCTIVE SYMPTOMS: No  SKELETAL SYMPTOMS: No  BLOOD SYMPTOMS: No  NERVOUS SYSTEM SYMPTOMS: No  MENTAL HEALTH SYMPTOMS: No

## 2023-01-19 NOTE — LETTER
1/19/2023         RE: Fran Singh  [id number] [address] [facility] [address]        Dear Colleague,    Thank you for referring your patient, Fran Singh, to the [facility] [place]. Please see a copy of my visit note below.    FOLLOWUP EVALUATION     SUBJECTIVE:  Fran presented with severe back pain and was initially treated with meloxicam and PT. The meloxicam was inadequate and then he called back and got some cyclobenzaprine which gave him profound, nearly immediate improvement.  He took it regularly for a couple days, then only in the evening and now only very rarely, like maybe once a month or so, is using it.  Visual analog pain scale is a 2.  Oswestry score is an 8.  Feels like he is doing much better.  He is doing his PT at home and came back today just to follow up.    OBJECTIVE:    GENERAL:  Physical exam shows a well-developed, well-nourished male in no acute distress.    MUSCULOSKELETAL:  His gait and station seem normal.    IMAGING:  Radiographic exam performed today includes AP and lateral EOS.  This shows his diffuse idiopathic skeletal hyperostosis.  I reviewed the imaging and went over the imaging with him, explained the concept of diffuse idiopathic skeletal hyperostosis and his mild degenerative scoliosis.    ASSESSMENT:  Back pain improved in a patient with DISH and degenerative scoliosis.    PLAN:  We had an extended discussion about nonoperative treatment and we talked about pain medication use, specifically about the challenge of cyclobenzaprine, that if he is using this occasionally, then that is very appropriate.      Answers for HPI/ROS submitted by the patient on 1/18/2023  General Symptoms: No  Skin Symptoms: No  HENT Symptoms: No  EYE SYMPTOMS: No  HEART SYMPTOMS: No  LUNG SYMPTOMS: No  INTESTINAL SYMPTOMS: No  URINARY SYMPTOMS: No  REPRODUCTIVE SYMPTOMS: No  SKELETAL SYMPTOMS: No  BLOOD SYMPTOMS: No  NERVOUS SYSTEM SYMPTOMS: No  MENTAL HEALTH  SYMPTOMS: No    Sincerely,        Jacobo Brown MD

## 2023-01-19 NOTE — NURSING NOTE
"Reason For Visit:   Chief Complaint   Patient presents with     RECHECK     Follow up physical therapy lumbar spondy         Primary MD: Ashwin Tillman  Ref. MD: Est    ?  No  Occupation Retired.     Date of injury: No  Type of injury: unknown.     Date of surgery: No  Type of surgery: No.     Smoker: No  Request smoking cessation information: No    Ht 1.887 m (6' 2.29\")   Wt 115.2 kg (254 lb)   BMI 32.36 kg/m      Pain Assessment  Patient Currently in Pain: Yes  0-10 Pain Scale: 2  Primary Pain Location: Back    Oswestry (CANDIDO) Questionnaire    OSWESTRY DISABILITY INDEX 1/18/2023   Count 10   Sum 4   Oswestry Score (%) 8   Some recent data might be hidden        Visual Analog Pain Scale  Back Pain Scale 0-10: 1.5  Right leg pain: 0  Left leg pain: 0  Neck Pain Scale 0-10: 0  Right arm pain: 0  Left arm pain: 0    Promis 10 Assessment    PROMIS 10 1/18/2023   In general, would you say your health is: Very good   In general, would you say your quality of life is: Very good   In general, how would you rate your physical health? Very good   In general, how would you rate your mental health, including your mood and your ability to think? Very good   In general, how would you rate your satisfaction with your social activities and relationships? Very good   In general, please rate how well you carry out your usual social activities and roles Very good   To what extent are you able to carry out your everyday physical activities such as walking, climbing stairs, carrying groceries, or moving a chair? Mostly   How often have you been bothered by emotional problems such as feeling anxious, depressed or irritable? Rarely   How would you rate your fatigue on average? Mild   How would you rate your pain on average?   0 = No Pain  to  10 = Worst Imaginable Pain 2   In general, would you say your health is: 4   In general, would you say your quality of life is: 4   In general, how would you rate your physical health? 4 "   In general, how would you rate your mental health, including your mood and your ability to think? 4   In general, how would you rate your satisfaction with your social activities and relationships? 4   In general, please rate how well you carry out your usual social activities and roles. (This includes activities at home, at work and in your community, and responsibilities as a parent, child, spouse, employee, friend, etc.) 4   To what extent are you able to carry out your everyday physical activities such as walking, climbing stairs, carrying groceries, or moving a chair? 4   In the past 7 days, how often have you been bothered by emotional problems such as feeling anxious, depressed, or irritable? 2   In the past 7 days, how would you rate your fatigue on average? 2   In the past 7 days, how would you rate your pain on average, where 0 means no pain, and 10 means worst imaginable pain? 2   Global Mental Health Score 16   Global Physical Health Score 16   PROMIS TOTAL - SUBSCORES 32   Some recent data might be hidden                Jessica Hui LPN

## 2023-01-30 ENCOUNTER — TELEPHONE (OUTPATIENT)
Dept: ANESTHESIOLOGY | Facility: CLINIC | Age: 63
End: 2023-01-30

## 2023-01-30 ENCOUNTER — TELEPHONE (OUTPATIENT)
Dept: ORTHOPEDICS | Facility: CLINIC | Age: 63
End: 2023-01-30

## 2023-01-30 DIAGNOSIS — M47.817 FACET ARTHRITIS OF LUMBOSACRAL REGION: Primary | ICD-10-CM

## 2023-01-30 DIAGNOSIS — M47.816 FACET ARTHROPATHY, LUMBAR: ICD-10-CM

## 2023-01-30 DIAGNOSIS — M41.56 SCOLIOSIS OF LUMBAR REGION DUE TO DEGENERATIVE DISEASE OF SPINE IN ADULT: ICD-10-CM

## 2023-01-30 DIAGNOSIS — M47.817 LUMBOSACRAL SPONDYLOSIS WITHOUT MYELOPATHY: Primary | ICD-10-CM

## 2023-01-30 NOTE — TELEPHONE ENCOUNTER
Spoke with pt who is wanting to get in sooner opting to go to [facility].     Gil Dawn on 1/30/2023 at 12:00 PM

## 2023-01-30 NOTE — TELEPHONE ENCOUNTER
See phone message from pt & my reply this am when I called pt back & sent urgent injection request message  to our pain clinic staff.    See second ph call from pt. 7 mins. After I spoke to him requesting to do injection at [facility].     himself & Supervisor Ayesha Hope called me directly at 1130 &   placed order for injection & said they could fit pt in tomorrow am 1-31-23 at 0800.    I called pt back at 1330 telling him I got a message from PMR/pain clinic staff that  called him at Noon but he declined tomorrow for injection & wants it done at [facility].  He stated he did not get a call about scheduling for tomorrow.  I told him they will fit him in the schedule for tomorrow but he declined &  asked me to fax order to [facility].  I told him to be sure he schedules multiple injections with [facility] & then if he qualifies, they can order & do  the Ablation.  He understood.  Faxed order.    Call back prn. Pt agreed.  W.O./Ro Swain RN.

## 2023-01-30 NOTE — TELEPHONE ENCOUNTER
I do not see mention of injection in last note. There is mention of L L4-L5 MBB in Nov. Please call patient to confirm and if so, we can order this.

## 2023-01-30 NOTE — TELEPHONE ENCOUNTER
See phone message from pt. & reply from Sherry ROMO.  See dictation 11-30-22.  I called pt back & clarified he wants to go ahead with the Left L4-5  MBB injections & work up for poss RFA as discussed 11-30-22.    I discussed he could do injections here at [facility] rather than [facility]. Because they will also do all the injections required & also the Ablation if he qualifies & he agreed.  States he is very miserable with pain & cant wait  or he will go to ER.    I told him we dont advise going to ER due to staff shortages, they are very busy.   I told him I will notify our PMR/Pain staff to try to do appt soon.    Call back prn.  Pt agreed.  WMERLY./Ro Swain RN.

## 2023-01-30 NOTE — TELEPHONE ENCOUNTER
Patient called and is ready to go forward with scheduling his injection ordered by Dr Brown. He is asking for th order to be sent to [facility] so he can schedule the injection closer to home in [place]. Please call patient at [phone] to confirm.    Thank you!

## 2023-02-01 ENCOUNTER — TELEPHONE (OUTPATIENT)
Dept: ORTHOPEDICS | Facility: CLINIC | Age: 63
End: 2023-02-01
Payer: COMMERCIAL

## 2023-02-01 NOTE — TELEPHONE ENCOUNTER
"[facility] Call Center    Phone Message    May a detailed message be left on voicemail: yes     Reason for Call: Other: Patient stated that [facility] received the physical therapy notes but not the office visit notes. I assured him that on Jessica's Telephone Encounter she had mentioned that both were faxed. Patient then asked to \"please fax it again.\"     Action Taken: Message routed to:  [facility]opedics    Travel Screening: Not Applicable                                                                       "

## 2023-02-01 NOTE — TELEPHONE ENCOUNTER
Writer called and talked with Jojo. Writer faxed over the office visit notes from Dr. Brown as they had the physical therapy notes from [facility]. Writer stated that pt has not been discharged from out clinic not sure what is needed there. The notes were faxed to [phone].     Jessica Hui LPN

## 2023-02-01 NOTE — PROGRESS NOTES
Discharge Note        Fran did not schedule further PT following his last visit on 12/21/22.  Please see information below for last relevant information on current status.  Patient was seen for 2 visits.    SUBJECTIVE  Subjective changes noted by patient:  Having less LBP and less muscle spasming.  Doing HEP as instructed.  Taking a muscle relaxor med.  .  Current pain level is  .     Previous pain level was   .   Changes in function:  Yes (See Goal flowsheet attached for changes in current functional level)  Adverse reaction to treatment or activity: None    OBJECTIVE  Changes noted in objective findings: Tolerated the progression of exercises today well.  Trunk ROM is improved and non painful.     ASSESSMENT/PLAN  Diagnosis: LBP   Updated problem list and treatment plan:   Pain - HEP  Decreased ROM/flexibility - HEP  Decreased strength - HEP  STG/LTGs have been met or progress has been made towards goals:  Yes, please see goal flowsheet for most current information  Assessment of Progress: current status is unknown.    Last current status:     Self Management Plans:  HEP  I have re-evaluated this patient and find that the nature, scope, duration and intensity of the therapy is appropriate for the medical condition of the patient.  Fran continues to require the following intervention to meet STG and LTG's:  HEP.    Recommendations:  Discharge with current home program.  Patient to follow up with MD as needed.    Please refer to the daily flowsheet for treatment today, total treatment time and time spent performing 1:1 timed codes.

## 2023-02-01 NOTE — TELEPHONE ENCOUNTER
[facility] Call Center    Phone Message    May a detailed message be left on voicemail: yes     Reason for Call: Other: Archana at [facility] radiology regarding order for medical branch block, she needs more PT notes, also looking for the consult and discharge notes, and the last two office visits notes with the doctor. Please call Archana at [phone]        Action Taken: Other: [facility]    Travel Screening: Not Applicable

## 2023-02-06 ENCOUNTER — TRANSFERRED RECORDS (OUTPATIENT)
Dept: HEALTH INFORMATION MANAGEMENT | Facility: CLINIC | Age: 63
End: 2023-02-06
Payer: COMMERCIAL

## 2023-02-09 ENCOUNTER — TRANSFERRED RECORDS (OUTPATIENT)
Dept: HEALTH INFORMATION MANAGEMENT | Facility: CLINIC | Age: 63
End: 2023-02-09
Payer: COMMERCIAL

## 2023-02-15 ENCOUNTER — TELEPHONE (OUTPATIENT)
Dept: ORTHOPEDICS | Facility: CLINIC | Age: 63
End: 2023-02-15
Payer: COMMERCIAL

## 2023-02-15 DIAGNOSIS — M47.816 FACET ARTHROPATHY, LUMBAR: Primary | ICD-10-CM

## 2023-02-15 NOTE — TELEPHONE ENCOUNTER
See phone message from [facility] that they completed MBB & need order for RFA.  Faxed new order.  Call back prn.  S.O.W.O./Ro Swain RN.

## 2023-02-15 NOTE — TELEPHONE ENCOUNTER
[facility] Call Center    Phone Message    May a detailed message be left on voicemail: no     Reason for Call: Order(s): Other:   Reason for requested:lumbar rhizotomy order  Date needed: asap  Provider name: Dr Brown    Medial branch block has been done-now requesting order for lumbar rhizotomy      Action Taken: Message routed to:  [facility]    Travel Screening: Not Applicable

## 2023-03-01 ENCOUNTER — TELEPHONE (OUTPATIENT)
Dept: ORTHOPEDICS | Facility: CLINIC | Age: 63
End: 2023-03-01
Payer: COMMERCIAL

## 2023-03-01 NOTE — TELEPHONE ENCOUNTER
[facility] Call Center    Phone Message    May a detailed message be left on voicemail: yes     Reason for Call Archana from [facility] asking for Order for 2nd half of Procedure  ( Radio Frequency  Ablation  ) .. [phone] fax .      Action Taken: Message routed to:  [facility] angela    Travel Screening: Not Applicable

## 2023-03-01 NOTE — TELEPHONE ENCOUNTER
[facility] Call Center    Phone Message    May a detailed message be left on voicemail: no     Reason for Call: Order(s): Other:   Patient said [facility] hasn't received the order for  Left L4-5 Rhizotomy yet-please refax     Action Taken: Message routed to:  [facility]    Travel Screening: Not Applicable

## 2023-03-01 NOTE — TELEPHONE ENCOUNTER
See phone message from [facility] that they did not get the order  we faxed earlier today for RFA.  I called [facility] & asked them again to check their fax# que & they did find the order.  Ro Swain RN.

## 2023-03-01 NOTE — TELEPHONE ENCOUNTER
See phone message from pt that [facility] did not receive the fax of order dated 2-15-23.  I refaxed.  Call back prn. Ro Swain RN.

## 2023-04-03 ENCOUNTER — TELEPHONE (OUTPATIENT)
Dept: ORTHOPEDICS | Facility: CLINIC | Age: 63
End: 2023-04-03
Payer: COMMERCIAL

## 2023-04-03 DIAGNOSIS — M47.816 FACET ARTHROPATHY, LUMBAR: ICD-10-CM

## 2023-04-03 DIAGNOSIS — M47.817 LUMBOSACRAL SPONDYLOSIS WITHOUT MYELOPATHY: Primary | ICD-10-CM

## 2023-04-03 NOTE — TELEPHONE ENCOUNTER
See phone message from pt.  I called him back.    He completed 2 MBB in jections at [facility] & tried to schedule the Ablation at [facility], who has the order,  But Insurance will not approve Ablation until he completes more PT so he is calling for order.  I entered New PT order & he will call his  PT & schedule & then he will call [facility] back.  Call back prn.  Pt agreed.  S.O./Ro Swain RN.

## 2023-04-03 NOTE — TELEPHONE ENCOUNTER
Patient is hoping to receive a call back from Dr. Brown's care team as soon as they are available. He is hoping Dr. Brown's team can help figure out what information his insurance company needs to cover his pending spinal injection. Please call patient at [phone] as soon as possible, per patient request. Okay to leave detailed message if needed.    Thank you!

## 2023-04-10 ENCOUNTER — THERAPY VISIT (OUTPATIENT)
Dept: PHYSICAL THERAPY | Facility: CLINIC | Age: 63
End: 2023-04-10
Attending: ORTHOPAEDIC SURGERY
Payer: COMMERCIAL

## 2023-04-10 ENCOUNTER — TELEPHONE (OUTPATIENT)
Dept: ORTHOPEDICS | Facility: CLINIC | Age: 63
End: 2023-04-10

## 2023-04-10 DIAGNOSIS — M47.816 FACET ARTHROPATHY, LUMBAR: ICD-10-CM

## 2023-04-10 DIAGNOSIS — M47.817 LUMBOSACRAL SPONDYLOSIS WITHOUT MYELOPATHY: ICD-10-CM

## 2023-04-10 PROCEDURE — 97110 THERAPEUTIC EXERCISES: CPT | Mod: GP | Performed by: PHYSICAL THERAPIST

## 2023-04-10 PROCEDURE — 97161 PT EVAL LOW COMPLEX 20 MIN: CPT | Mod: GP | Performed by: PHYSICAL THERAPIST

## 2023-04-10 NOTE — TELEPHONE ENCOUNTER
See phone message from [facility].  I called back & spoke to [facility] staff & reviewed Triage note of 4-3-23 & told them we ordered PT required by Insurance before they will consider approving Ablation order & pt already started PT today 4-10-23 & has further PT appts scheduled through  through 5-3-23.  Pt was already instructed to have PT fax notes to [facility] so they can submit to insurance. Call back prn. Ro Swain RN.

## 2023-04-10 NOTE — TELEPHONE ENCOUNTER
[facility] Call Center    Phone Message    May a detailed message be left on voicemail: yes     Reason for Call: Other: Archana with [facility] radiology calling regarding a lumber ablation order, pt is required to complete physical therapy, unwilling to follow up with doctor on the order and how to proceed, [facility] need information on how to proceed, insurance will not pay for ablation if physical therapy is not completed. Please call Archana at [phone]     Action Taken: Other:  ortho    Travel Screening: Not Applicable

## 2023-04-10 NOTE — PROGRESS NOTES
Physical Therapy Initial Evaluation  Subjective:  Onset of lumbar spine 11/22 after sitting in a [place]. Pt had previous physical therapy 12/22 which helped decrease pain. Pt noted increased pain 1/23 and was scheduled for ablation surgery but insurance is requiring 4 additional visits of physical therapy before authorizing the ablation surgery. Pt referred by MD for additional therapy on 4-3-23    The history is provided by the patient. No  was used.   Patient Health History  Fran Singh being seen for PT.     Problem began: 11/1/2022.   Problem occurred: Sitting   Pain is reported as 2/10 on pain scale.  General health as reported by patient is good.  Pertinent medical history includes: none.     Medical allergies: none.   Surgeries include:  None.    Current medications:  None.    Current occupation is Retired.   Primary job tasks include:  Computer work, driving, lifting/carrying, prolonged sitting and prolonged standing.                  Therapist Generated HPI Evaluation         Type of problem:  Lumbar.    This is a chronic condition.  Condition occurred with:  Other reason.  Where condition occurred: in the community.  Patient reports pain:  Lumbar spine left.  Pain is described as aching, sharp and stabbing   Pain radiates to:  Gluteals left, thigh left, knee left, lower leg left and foot left. Pain is the same all the time.  Since onset symptoms are unchanged.  Associated symptoms:  Loss of motion/stiffness and loss of strength. Symptoms are exacerbated by sitting, standing, bending, carrying and lifting  and relieved by rest.  Special tests included:  MRI and x-ray (see chart).  Previous treatment includes physical therapy (2 visits). There was significant improvement following previous treatment.  Work activity restrictions: semi retired   Barriers include:  None as reported by patient.                        Objective:        Flexibility/Screens:       Lower  Extremity:  Decreased left lower extremity flexibility:Piriformis and Hamstrings    Decreased right lower extremity flexibility:  Piriformis and Hamstrings               Lumbar/SI Evaluation  ROM:    AROM Lumbar:   Flexion:            Minimal loss   Ext:                    Moderate loss    Side Bend:        Left:  Minimal loss     Right:  Minimal loss   Rotation:           Left:  Moderate loss     Right:  Moderate loss   Side Glide:        Left:     Right:         Strength: weak lower abdominals and pelvic stabilizers             Lumbar Palpation:  Palpation (lumbar): point tenderness L2-L5 spinous processes and left lumbar paraspinals.                                                          General     ROS    Assessment/Plan:    Patient is a 63 year old male with lumbar complaints.    Patient has the following significant findings with corresponding treatment plan.                Diagnosis 1:  Lumbar spondylosis without myelopathy , lumbar facet arthropathy  Pain -  hot/cold therapy, self management, education and home program  Decreased ROM/flexibility - manual therapy, therapeutic exercise, therapeutic activity and home program  Decreased strength - therapeutic exercise, therapeutic activities and home program    Therapy Evaluation Codes:   1) History comprised of:   Personal factors that impact the plan of care:      None.    Comorbidity factors that impact the plan of care are:      None.     Medications impacting care: flomax.  2) Examination of Body Systems comprised of:   Body structures and functions that impact the plan of care:      Lumbar spine.   Activity limitations that impact the plan of care are:      Dressing, Lifting, Sitting, Standing and Walking.  3) Clinical presentation characteristics are:   Stable/Uncomplicated.  4) Decision-Making    Low complexity using standardized patient assessment instrument and/or measureable assessment of functional outcome.  Cumulative Therapy Evaluation is: Low  complexity.    Previous and current functional limitations:  (See Goal Flow Sheet for this information)    Short term and Long term goals: (See Goal Flow Sheet for this information)     Communication ability:  Patient appears to be able to clearly communicate and understand verbal and written communication and follow directions correctly.  Treatment Explanation - The following has been discussed with the patient:   RX ordered/plan of care  Anticipated outcomes  Possible risks and side effects  This patient would benefit from PT intervention to resume normal activities.   Rehab potential is good.    Frequency:  1 X week, once daily  Duration:  for 4 weeks  Discharge Plan:  Achieve all LTG.  Independent in home treatment program.  Reach maximal therapeutic benefit.    Please refer to the daily flowsheet for treatment today, total treatment time and time spent performing 1:1 timed codes.

## 2023-04-17 NOTE — TELEPHONE ENCOUNTER
[facility] Call Center    Phone Message    May a detailed message be left on voicemail: yes     Reason for Call: Order(s): Other:   Reason for requested: Injection to [facility] in [place]  Date needed: asap  Provider name: Jacobo Brown    Per patient - unable to wait a week for a call back. Please put orders for injections to [facility] in [place].      Action Taken: Other: [facility]    Travel Screening: Not Applicable                                                                        Message sent via [employer]

## 2023-04-19 ENCOUNTER — THERAPY VISIT (OUTPATIENT)
Dept: PHYSICAL THERAPY | Facility: CLINIC | Age: 63
End: 2023-04-19
Payer: COMMERCIAL

## 2023-04-19 DIAGNOSIS — M47.817 LUMBOSACRAL SPONDYLOSIS WITHOUT MYELOPATHY: ICD-10-CM

## 2023-04-19 DIAGNOSIS — M47.816 FACET ARTHROPATHY, LUMBAR: Primary | ICD-10-CM

## 2023-04-19 PROCEDURE — 97035 APP MDLTY 1+ULTRASOUND EA 15: CPT | Mod: GP | Performed by: PHYSICAL THERAPIST

## 2023-04-19 PROCEDURE — 97110 THERAPEUTIC EXERCISES: CPT | Mod: GP | Performed by: PHYSICAL THERAPIST

## 2023-04-27 ENCOUNTER — THERAPY VISIT (OUTPATIENT)
Dept: PHYSICAL THERAPY | Facility: CLINIC | Age: 63
End: 2023-04-27
Payer: COMMERCIAL

## 2023-04-27 DIAGNOSIS — M47.817 LUMBOSACRAL SPONDYLOSIS WITHOUT MYELOPATHY: ICD-10-CM

## 2023-04-27 DIAGNOSIS — M47.816 FACET ARTHROPATHY, LUMBAR: Primary | ICD-10-CM

## 2023-04-27 PROCEDURE — 97110 THERAPEUTIC EXERCISES: CPT | Mod: GP | Performed by: PHYSICAL THERAPIST

## 2023-04-27 PROCEDURE — 97035 APP MDLTY 1+ULTRASOUND EA 15: CPT | Mod: GP | Performed by: PHYSICAL THERAPIST

## 2023-04-27 NOTE — LETTER
[facility]  [address] [id number] [phone]    May 3, 2023    Re: Fran Singh   :   1960  MRN:  [id number]   REFERRING PHYSICIAN:   Jacobo FROST [facility]    Date of Initial Evaluation:  ***  Visits:  Rxs Used: 3  Reason for Referral:     Facet arthropathy, lumbar  Lumbosacral spondylosis without myelopathy    EVALUATION SUMMARY    Subjective:  HPI  Physical Exam                    Objective:  System    Physical Exam    General     ROS    Assessment/Plan:    SUBJECTIVE  Subjective changes as noted by pt:       Current pain level: 110     Changes in function:  Pt still notes difficulty with sleeping     Adverse reaction to treatment or activity:  None    OBJECTIVE  Changes in objective findings:  Pt demonstrates improved core stability on the exercise ball.         ASSESSMENT  Fran continues to require intervention to meet STG and LTG's: PT  Patient's symptoms are resolving.  Response to therapy has shown an improvement in  pain level  Progress made towards STG/LTG?  Yes,     PLAN  Current treatment program is being advanced to more complex exercises.    PTA/ATC plan:  N/A    Please refer to the daily flowsheet for treatment today, total treatment time and time spent performing 1:1 timed codes.          Thank you for your referral.    INQUIRIES  Therapist: GARY Orr [facility]  [address] [address]  Phone: [phone]  Fax: [phone]

## 2023-04-27 NOTE — PROGRESS NOTES
Subjective:  HPI  Physical Exam                    Objective:  System    Physical Exam    General     ROS    Assessment/Plan:    SUBJECTIVE  Subjective changes as noted by pt:       Current pain level: 1/10     Changes in function:  Pt still notes difficulty with sleeping     Adverse reaction to treatment or activity:  None    OBJECTIVE  Changes in objective findings:  Pt demonstrates improved core stability on the exercise ball.         ASSESSMENT  Fran continues to require intervention to meet STG and LTG's: PT  Patient's symptoms are resolving.  Response to therapy has shown an improvement in  pain level  Progress made towards STG/LTG?  Yes,     PLAN  Current treatment program is being advanced to more complex exercises.    PTA/ATC plan:  N/A    Please refer to the daily flowsheet for treatment today, total treatment time and time spent performing 1:1 timed codes.

## 2023-04-27 NOTE — LETTER
May 25, 2023      Param Singh  [id number] [facility] [place] [address]        Dear Francisco,    We are writing to inform you of your test results.    {results letter list:[id number]}    No results found from the In Basket message.    If you have any questions or concerns, please call the clinic at the number listed above.       Sincerely,

## 2023-05-03 ENCOUNTER — THERAPY VISIT (OUTPATIENT)
Dept: PHYSICAL THERAPY | Facility: CLINIC | Age: 63
End: 2023-05-03
Payer: COMMERCIAL

## 2023-05-03 DIAGNOSIS — M47.817 LUMBOSACRAL SPONDYLOSIS WITHOUT MYELOPATHY: ICD-10-CM

## 2023-05-03 DIAGNOSIS — M47.816 FACET ARTHROPATHY, LUMBAR: Primary | ICD-10-CM

## 2023-05-03 PROCEDURE — 97035 APP MDLTY 1+ULTRASOUND EA 15: CPT | Mod: GP | Performed by: PHYSICAL THERAPIST

## 2023-05-03 PROCEDURE — 97110 THERAPEUTIC EXERCISES: CPT | Mod: GP | Performed by: PHYSICAL THERAPIST

## 2023-05-03 NOTE — PROGRESS NOTES
Subjective:  HPI  Physical Exam                    Objective:  System    Physical Exam    General     ROS    Assessment/Plan:    DISCHARGE REPORT    Progress reporting period is from 4-10-23 to 5-3-23.       SUBJECTIVE  Subjective changes noted by patient:  pt notes some improvement in strength and flexibility with physical therapy. Pt reports no change in pain level or ability to do daily activities. .       Current pain level is 2/10  .     Previous pain level was  2/10  .   Changes in function:  Pt still notes difficulty with lifting and prolonged standing/sitting  Adverse reaction to treatment or activity: None    OBJECTIVE  Changes noted in objective findings:  Lumbar flexion and extension remain limited. Pt demonstrates improved strength abdominals and pelvic stabilizers. Hamstrings and hip flexors remain tight.         ASSESSMENT/PLAN  Updated problem list and treatment plan: Diagnosis 1:  Lumbar facet arthropathy, lumbosacral spondylosis without myelopathy Pain -  hot/cold therapy, US, self management, education and home program  Decreased ROM/flexibility - manual therapy, therapeutic exercise, therapeutic activity and home program  Decreased strength - therapeutic exercise, therapeutic activities and home program  STG/LTGs have been met or progress has been made towards goals:  Yes (See Goal flow sheet completed today.) and pt has made limited progress towards goals.   Assessment of Progress: The patient's condition is unchanged.  Self Management Plans:  Patient has been instructed in a home treatment program.  I have re-evaluated this patient and find that the nature, scope, duration and intensity of the therapy is appropriate for the medical condition of the patient.  Fran continues to require the following intervention to meet STG and LTG's:  Physical therapy is no longer recommended    Recommendations:  This patient is ready to be discharged from therapy. Pt did not notice significant improvement  with his symptoms from therapy. Pt would benefit from ablation surgery.     Please refer to the daily flowsheet for treatment today, total treatment time and time spent performing 1:1 timed codes.

## 2023-05-30 ENCOUNTER — TRANSFERRED RECORDS (OUTPATIENT)
Dept: HEALTH INFORMATION MANAGEMENT | Facility: CLINIC | Age: 63
End: 2023-05-30
Payer: COMMERCIAL

## 2024-05-11 ENCOUNTER — HEALTH MAINTENANCE LETTER (OUTPATIENT)
Age: 64
End: 2024-05-11

## 2025-05-17 ENCOUNTER — HEALTH MAINTENANCE LETTER (OUTPATIENT)
Age: 65
End: 2025-05-17